# Patient Record
Sex: FEMALE | Race: OTHER | HISPANIC OR LATINO | Employment: FULL TIME | URBAN - NONMETROPOLITAN AREA
[De-identification: names, ages, dates, MRNs, and addresses within clinical notes are randomized per-mention and may not be internally consistent; named-entity substitution may affect disease eponyms.]

---

## 2018-05-29 ENCOUNTER — EVALUATION (OUTPATIENT)
Dept: PHYSICAL THERAPY | Facility: CLINIC | Age: 48
End: 2018-05-29
Payer: COMMERCIAL

## 2018-05-29 DIAGNOSIS — S62.354D CLOSED NONDISPLACED FRACTURE OF SHAFT OF FOURTH METACARPAL BONE OF RIGHT HAND WITH ROUTINE HEALING, SUBSEQUENT ENCOUNTER: Primary | ICD-10-CM

## 2018-05-29 PROCEDURE — 97014 ELECTRIC STIMULATION THERAPY: CPT | Performed by: PHYSICAL THERAPIST

## 2018-05-29 PROCEDURE — G8988 SELF CARE GOAL STATUS: HCPCS | Performed by: PHYSICAL THERAPIST

## 2018-05-29 PROCEDURE — 97140 MANUAL THERAPY 1/> REGIONS: CPT | Performed by: PHYSICAL THERAPIST

## 2018-05-29 PROCEDURE — G8987 SELF CARE CURRENT STATUS: HCPCS | Performed by: PHYSICAL THERAPIST

## 2018-05-29 PROCEDURE — 97110 THERAPEUTIC EXERCISES: CPT | Performed by: PHYSICAL THERAPIST

## 2018-05-29 PROCEDURE — 97162 PT EVAL MOD COMPLEX 30 MIN: CPT | Performed by: PHYSICAL THERAPIST

## 2018-05-29 NOTE — LETTER
May 30, 2018    MD Odilon Younger [address] [place] [id number]    Patient: Negrita Esteves   YOB: 1970   Date of Visit: 2018     Encounter Diagnosis     ICD-10-CM    1  Closed nondisplaced fracture of shaft of fourth metacarpal bone of right hand with routine healing, subsequent encounter S62 354D        Dear Dr Merrill Coil:    Please review the attached Plan of Care from [facility]'s recent visit  Please verify that you agree therapy should continue by signing the attached document and sending it back to our office  If you have any questions or concerns, please don't hesitate to call  Sincerely,  Deanna Curtis, NAT, CHT    Referring Provider:      I certify that I have read the below Plan of Care and certify the need for these services furnished under this plan of treatment while under my care  MD Odilon Younger [address] [place] [address]  VIA Facsimile: [phone]          PT Evaluation     Today's date: 2018  Patient name: Negrita Esteves  : 1970  MRN: [id number]  Referring provider: Pat Brizuela MD  Dx:   Encounter Diagnosis     ICD-10-CM    1  Closed nondisplaced fracture of shaft of fourth metacarpal bone of right hand with routine healing, subsequent encounter S62 354D                   Assessment    Assessment details: Pt is a 42 YO female presenting to PT with pain, decreased AROM, strength and tolerance to activity  Pt would benefit from skilled intervention to adddress these issues and maximize overall function  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  Dominant- Right; Involved-Right      Goals  ST  Decrease pain to 3-4 in 4 weeks            2  Decrease swelling            3  Increase AROM to composite fist            4   Provide orthotic for protection  LT  Increase functional motion and strength for independence with ADL and self care by DC            2   Ability to RTW and recreational activity by DC    Plan  Frequency: 2x week  Duration in weeks: 4  Treatment plan discussed with: patient        Subjective Evaluation    History of Present Illness  Date of surgery: 2018  Mechanism of injury: Pt trapped her hand in a door at home 18 fracturing her 4th MC  Pain  Current pain ratin  At best pain ratin  At worst pain ratin  Location: right hand    Hand dominance: right    Treatments  Current treatment: physical therapy  Patient Goals  Patient goals for therapy: decreased edema, decreased pain, increased motion, increased strength and return to work          Objective     General Comments     Wrist/Hand Comments  AROM right wrist E/F- 50/50; LF MP- 0/50 PIP- 0/85;  DIP- 0/40;                                                 RF MP- 10/30; PIP- 15/80; DIP- 0/40;                                                  SF MP- 0/35; PIP- 0/80; DIP- 0/40  Strength- deferred  Sensation- occasional tingling RF and SF; intact to LT  Inspection- removed dressings; fitted with custom HFO including MPs  Circumference at wrist- 17 0 cm; MPs- 20 0 cm      Flowsheet Rows      Most Recent Value   PT/OT G-Codes   Current Score  psfs-83   Projected Score  psfs-20   FOTO information reviewed  N/A   Assessment Type  Evaluation   G code set  Self-Care   Self Care Current Status ()  CM   Self Care Goal Status ()  CJ          Precautions: wear orthosis when active; no aggressive activity    Daily Treatment Diary     Manual              STM 15                                                                    Exercise Diary              TGE 3x            Wrist AROM 10x                                                                                                                                                                                                                                                          Modalities              HP/IFC 15 CP

## 2018-05-29 NOTE — PROGRESS NOTES
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- Right; Involved-Right      Goals  ST  Decrease pain to 3-4 in 4 weeks            2  Decrease swelling            3  Increase AROM to composite fist            4   Provide orthotic for protection  LT  Increase functional motion and strength for independence with ADL and self care by DC            2  Ability to RTW and recreational activity by DC    Plan  Frequency: 2x week  Duration in weeks: 4  Treatment plan discussed with: patient        Subjective Evaluation    History of Present Illness  Date of surgery: 2018  Mechanism of injury: Pt trapped her hand in a door at home 18 fracturing her 4th MC  Pain  Current pain ratin  At best pain ratin  At worst pain ratin  Location: right hand    Hand dominance: right    Treatments  Current treatment: physical therapy  Patient Goals  Patient goals for therapy: decreased edema, decreased pain, increased motion, increased strength and return to work          Objective     General Comments     Wrist/Hand Comments  AROM right wrist E/F- 50/50; LF MP- 0/50 PIP- 0/85;  DIP- 0/40;                                                 RF MP- 10/30; PIP- 15/80; DIP- 0/40;                                                  SF MP- 0/35; PIP- 0/80; DIP- 0/40  Strength- deferred  Sensation- occasional tingling RF and SF; intact to LT  Inspection- removed dressings; fitted with custom HFO including MPs  Circumference at wrist- 17 0 cm; MPs- 20 0 cm      Flowsheet Rows      Most Recent Value   PT/OT G-Codes   Current Score  psfs-83   Projected Score  psfs-20   FOTO information reviewed  N/A   Assessment Type  Evaluation   G code set  Self-Care   Self Care Current Status ()  CM   Self Care Goal Status ()  CJ          Precautions: wear orthosis when active; no aggressive activity    Daily Treatment Diary     Manual  5/29            STM 15                                                                    Exercise Diary  5/29            TGE 3x            Wrist AROM 10x                                                                                                                                                                                                                                                          Modalities  5/20            HP/IFC 15                         CP

## 2018-05-30 ENCOUNTER — TRANSCRIBE ORDERS (OUTPATIENT)
Dept: PHYSICAL THERAPY | Facility: CLINIC | Age: 48
End: 2018-05-30

## 2018-05-30 DIAGNOSIS — S62.354D: Primary | ICD-10-CM

## 2018-06-01 ENCOUNTER — OFFICE VISIT (OUTPATIENT)
Dept: PHYSICAL THERAPY | Facility: CLINIC | Age: 48
End: 2018-06-01
Payer: COMMERCIAL

## 2018-06-01 DIAGNOSIS — S62.354D CLOSED NONDISPLACED FRACTURE OF SHAFT OF FOURTH METACARPAL BONE OF RIGHT HAND WITH ROUTINE HEALING, SUBSEQUENT ENCOUNTER: Primary | ICD-10-CM

## 2018-06-01 PROCEDURE — 97110 THERAPEUTIC EXERCISES: CPT | Performed by: PHYSICAL THERAPIST

## 2018-06-01 PROCEDURE — 97140 MANUAL THERAPY 1/> REGIONS: CPT | Performed by: PHYSICAL THERAPIST

## 2018-06-01 PROCEDURE — 97760 ORTHOTIC MGMT&TRAING 1ST ENC: CPT | Performed by: PHYSICAL THERAPIST

## 2018-06-01 PROCEDURE — 97035 APP MDLTY 1+ULTRASOUND EA 15: CPT | Performed by: PHYSICAL THERAPIST

## 2018-06-01 PROCEDURE — 97014 ELECTRIC STIMULATION THERAPY: CPT | Performed by: PHYSICAL THERAPIST

## 2018-06-01 NOTE — PROGRESS NOTES
Daily Note     Today's date: 2018  Patient name: Manuela Ahumada  : 1970  MRN: [id number]  Referring provider: Louann Rodriguez MD  Dx:   Encounter Diagnosis     ICD-10-CM    1  Closed nondisplaced fracture of shaft of fourth metacarpal bone of right hand with routine healing, subsequent encounter S62 268P                   Subjective: less soreness, splint protecting      Objective: See treatment diary below    AROM right wrist E/F- 50/50; LF MP- 0/50 PIP- 0/85; DIP- 0/40;                                                 RF MP- 10/30; PIP- 15/80; DIP- 0/40;                                                  SF MP- 0/35; PIP- 0/80; DIP- 0/40  Strength- deferred  Sensation- occasional tingling RF and SF; intact to LT  Inspection- removed dressings; fitted with custom HFO including MPs  Circumference at wrist- 17 0 cm; MPs- 20 0 cm   Pt provided with additional liners; initiated US    Daily Treatment Diary      Manual                     STM 15  15                                           liners                                                                             Exercise Diary                     TGE 3x  3x                   Wrist AROM 10x  10x                                                                                                                                                                                                                                                                                                                                                                                                                                                                         Modalities                     HP/IFC 15  15                    US 3MZ   12                   CP    15                                Assessment: Tolerated treatment well  Patient would benefit from continued PT      Plan: Continue per plan of care

## 2018-06-06 ENCOUNTER — OFFICE VISIT (OUTPATIENT)
Dept: PHYSICAL THERAPY | Facility: CLINIC | Age: 48
End: 2018-06-06
Payer: COMMERCIAL

## 2018-06-06 DIAGNOSIS — S62.354D CLOSED NONDISPLACED FRACTURE OF SHAFT OF FOURTH METACARPAL BONE OF RIGHT HAND WITH ROUTINE HEALING, SUBSEQUENT ENCOUNTER: Primary | ICD-10-CM

## 2018-06-06 PROCEDURE — 97110 THERAPEUTIC EXERCISES: CPT

## 2018-06-06 PROCEDURE — 97014 ELECTRIC STIMULATION THERAPY: CPT

## 2018-06-06 PROCEDURE — 97140 MANUAL THERAPY 1/> REGIONS: CPT

## 2018-06-06 PROCEDURE — 97035 APP MDLTY 1+ULTRASOUND EA 15: CPT

## 2018-06-06 PROCEDURE — 97760 ORTHOTIC MGMT&TRAING 1ST ENC: CPT

## 2018-06-06 NOTE — PROGRESS NOTES
Daily Note     Today's date: 2018  Patient name: Juan Carlos Kinsey  : 1970  MRN: [id number]  Referring provider: Nati Lynne MD  Dx:   Encounter Diagnosis     ICD-10-CM    1  Closed nondisplaced fracture of shaft of fourth metacarpal bone of right hand with routine healing, subsequent encounter S64 799V                   Subjective: Pt CC is inability to bend her fingers like "she would like"  Soreness and stiffness main concern for patient   Objective:AROM right wrist E/F- 50/50; LF MP- 0/50 PIP- 0/85; DIP- 0/40;                                                 RF MP- 10/30; PIP- 15/80; DIP- 0/40;                                                  SF MP- 0/35; PIP- 0/80; DIP- 0/40  Strength- deferred  Sensation- occasional tingling RF and SF; intact to LT  Inspection- removed dressings; fitted with custom HFO including MPs  Circumference at wrist- 17 0 cm; MPs- 20 0 cm   Pt provided with additional liners; initiated US     Daily Treatment Diary      Manual                   STM 15  15  15                                         liners       X                                                                       Exercise Diary                   TGE 3x  3x  3x                 Wrist AROM 10x  10x  10x                                                                                                                                                                                                                                                                                                                                                                                                                                                                       Modalities                   HP/IFC 15  15  15                  US 3MZ   12  12                 CP    15  15                                Assessment: Tolerated treatment well   Patient would benefit from continued PT  Pt educated about HEP   Additional liner given today         Plan: Continue per plan of care

## 2018-06-13 ENCOUNTER — APPOINTMENT (OUTPATIENT)
Dept: PHYSICAL THERAPY | Facility: CLINIC | Age: 48
End: 2018-06-13
Payer: COMMERCIAL

## 2018-06-15 ENCOUNTER — OFFICE VISIT (OUTPATIENT)
Dept: PHYSICAL THERAPY | Facility: CLINIC | Age: 48
End: 2018-06-15
Payer: COMMERCIAL

## 2018-06-15 DIAGNOSIS — S62.354D CLOSED NONDISPLACED FRACTURE OF SHAFT OF FOURTH METACARPAL BONE OF RIGHT HAND WITH ROUTINE HEALING, SUBSEQUENT ENCOUNTER: Primary | ICD-10-CM

## 2018-06-15 PROCEDURE — 97014 ELECTRIC STIMULATION THERAPY: CPT

## 2018-06-15 PROCEDURE — 97140 MANUAL THERAPY 1/> REGIONS: CPT

## 2018-06-15 PROCEDURE — 97035 APP MDLTY 1+ULTRASOUND EA 15: CPT

## 2018-06-15 PROCEDURE — 97110 THERAPEUTIC EXERCISES: CPT

## 2018-06-15 NOTE — PROGRESS NOTES
Daily Note     Today's date: 6/15/2018  Patient name: Suly Shaikh  : 1970  MRN: [id number]  Referring provider: Daniel Betts MD  Dx:   Encounter Diagnosis     ICD-10-CM    1  Closed nondisplaced fracture of shaft of fourth metacarpal bone of right hand with routine healing, subsequent encounter A38 716H                   Subjective: Pt reported continued stiffness in R RF, however pt noted improved ROM while doing TGE  Objective: See treatment diary below    AROM right wrist E/F- 50/50; LF MP- 0/50 PIP- 0/85;  DIP- 0/40;                                                 RF MP- 0/60; PIP- 0/95; DIP- 0/65;                                                  SF MP- 0/60; PIP- 0/95; DIP- 0/70  Strength- deferred  Sensation- occasional tingling RF and SF; intact to LT  Inspection- removed dressings; fitted with custom HFO including MPs  Circumference at wrist- 17 0 cm; MPs- 20 0 cm   Pt provided with additional liners; initiated US    Manual  5/29  6/1  6/6  6/15               STM 15  15  15  12                                       liners       X                                                                       Exercise Diary  5/29  6/1  6/6  6/15               TGE 3x  3x  3x  3x               Wrist AROM 10x  10x  10x  10x                                        TP         T 2'                  Scrape         XS 2'                Twister                        Blue        II /10                                                                                                                                                                                                                                                                                                                                             Modalities  5/29  6/1  6/6  6/15               HP/IFC 15  15  15  15                US 3MZ   12  12  13               CP    15  15  15                                 Assessment: Pt tolerated exercise progression well w/ some difficulty noted d/t decreased strength  Pt was shown AAROM exercises for home to improve composite fist   Pt would benefit from continued PT  Plan: Continue per plan of care

## 2018-06-21 ENCOUNTER — OFFICE VISIT (OUTPATIENT)
Dept: PHYSICAL THERAPY | Facility: CLINIC | Age: 48
End: 2018-06-21
Payer: COMMERCIAL

## 2018-06-21 DIAGNOSIS — S62.354D CLOSED NONDISPLACED FRACTURE OF SHAFT OF FOURTH METACARPAL BONE OF RIGHT HAND WITH ROUTINE HEALING, SUBSEQUENT ENCOUNTER: Primary | ICD-10-CM

## 2018-06-21 PROCEDURE — 97140 MANUAL THERAPY 1/> REGIONS: CPT

## 2018-06-21 PROCEDURE — 97035 APP MDLTY 1+ULTRASOUND EA 15: CPT

## 2018-06-21 PROCEDURE — 97110 THERAPEUTIC EXERCISES: CPT

## 2018-06-21 PROCEDURE — 97014 ELECTRIC STIMULATION THERAPY: CPT

## 2018-06-21 NOTE — PROGRESS NOTES
Daily Note     Today's date: 2018  Patient name: Deepali Etienne  : 1970  MRN: [id number]  Referring provider: Kayla Perez MD  Dx:   Encounter Diagnosis     ICD-10-CM    1  Closed nondisplaced fracture of shaft of fourth metacarpal bone of right hand with routine healing, subsequent encounter S61 524L                   Subjective: Pt reported R hand feels very stiff in the morning, however pt noted it feels better after performing her tendon glides   Pt also reported being able to peel and cut potatoes which she was not able to do before  Objective: See treatment diary below    AROM right wrist E/F- 50/50; LF MP- 0/50 PIP- 0/85;  DIP- 0/40;                                                 RF MP- 0/60; PIP- 0/95; DIP- 0/65;                                                  SF MP- 0/60; PIP- 0/95; DIP- 0/70  Strength- deferred  Sensation- occasional tingling RF and SF; intact to LT  Inspection- removed dressings; fitted with custom HFO including MPs  Circumference at wrist- 17 0 cm; MPs- 20 0 cm   Pt provided with additional liners; initiated US     Manual  5/29  6/1  6/6  6/15  6/21             STM 15  15  15  12  12                                     liners       X                                                                       Exercise Diary  5/29  6/1  6/6  6/15  6/21             TGE 3x  3x  3x  3x  3x             Wrist AROM 10x  10x  10x  10x  10x                                      TP         T 2'  T 2'                Scrape         XS 2' XS 2'              Twister                      Blue        II 2/10 II 2/10                                                                                                                                                                                                                                                                                                                                           Modalities  5/29  6/1  6/6  6/15  6/21             HP/IFC 15  15  15  15  15              US 3MZ   12  12  12  12             CP    15  15  15  15                                     Assessment: Tolerated treatment well  Patient would benefit from continued PT      Plan: Continue per plan of care

## 2018-06-22 ENCOUNTER — APPOINTMENT (OUTPATIENT)
Dept: PHYSICAL THERAPY | Facility: CLINIC | Age: 48
End: 2018-06-22
Payer: COMMERCIAL

## 2018-06-27 ENCOUNTER — OFFICE VISIT (OUTPATIENT)
Dept: PHYSICAL THERAPY | Facility: CLINIC | Age: 48
End: 2018-06-27
Payer: COMMERCIAL

## 2018-06-27 DIAGNOSIS — S62.354D CLOSED NONDISPLACED FRACTURE OF SHAFT OF FOURTH METACARPAL BONE OF RIGHT HAND WITH ROUTINE HEALING, SUBSEQUENT ENCOUNTER: Primary | ICD-10-CM

## 2018-06-27 PROCEDURE — G8987 SELF CARE CURRENT STATUS: HCPCS | Performed by: PHYSICAL THERAPIST

## 2018-06-27 PROCEDURE — 97760 ORTHOTIC MGMT&TRAING 1ST ENC: CPT

## 2018-06-27 PROCEDURE — 97110 THERAPEUTIC EXERCISES: CPT

## 2018-06-27 PROCEDURE — 97140 MANUAL THERAPY 1/> REGIONS: CPT

## 2018-06-27 PROCEDURE — G8988 SELF CARE GOAL STATUS: HCPCS | Performed by: PHYSICAL THERAPIST

## 2018-06-27 PROCEDURE — 97035 APP MDLTY 1+ULTRASOUND EA 15: CPT

## 2018-06-27 PROCEDURE — 97014 ELECTRIC STIMULATION THERAPY: CPT

## 2018-06-27 NOTE — PROGRESS NOTES
PT Re-Evaluation     Today's date: 2018  Patient name: Suly Shaikh  : 1970  MRN: [id number]  Referring provider: Daniel Betts MD  Dx:   Encounter Diagnosis     ICD-10-CM    1  Closed nondisplaced fracture of shaft of fourth metacarpal bone of right hand with routine healing, subsequent encounter S62 354D        Start Time: 7934  Stop Time: 1455  Total time in clinic (min): 70 minutes    Assessment    Assessment details: Pt is a 40 YO female presenting to PT with pain, decreased AROM, strength and tolerance to activity  Pt would benefit from skilled intervention to adddress these issues and maximize overall function  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  Dominant- Right; Involved-Right  Pt is progressing steadily with AROM and light strengthening  She has cont working light duty    Goals  ST  Decrease pain to 3-4 in 4 weeks            2  Decrease swelling            3  Increase AROM to composite fist            4   Provide orthotic for protection  LT  Increase functional motion and strength for independence with ADL and self care by DC            2  Ability to RTW and recreational activity by DC    Plan  Frequency: 2x week  Duration in weeks: 4  Treatment plan discussed with: patient        Subjective Evaluation    History of Present Illness  Date of surgery: 2018  Mechanism of injury: Pt trapped her hand in a door at home 18 fracturing her 4th MC  Pain  Current pain ratin  At best pain ratin  At worst pain ratin  Location: right hand    Hand dominance: right    Treatments  Current treatment: physical therapy  Patient Goals  Patient goals for therapy: decreased edema, decreased pain, increased motion, increased strength and return to work          Objective     General Comments     Wrist/Hand Comments  AROM right wrist E/F- 50/50; LF MP- 0/50 PIP- 0/85;  DIP- 0/40;                                                 RF MP- 0/60; PIP- 0/95; DIP- 0/65;                                                  HQ MP- 0/60; PIP- 0/95; DIP- 0/70  Strength- deferred  Sensation- occasional tingling RF and SF; intact to LT  Inspection- removed dressings; fitted with custom HFO including MPs  Circumference at wrist- 17 0 cm; MPs- 20 0 cm   Pt provided with additional liners; initiated US for scar          Flowsheet Rows      Most Recent Value   PT/OT G-Codes   Current Score  psfs-42   Projected Score  psfs-20   FOTO information reviewed  N/A   Assessment Type  Re-evaluation   G code set  Self-Care   Self Care Current Status ()  CK   Self Care Goal Status ()  CJ          Precautions: wear orthosis when active; no aggressive activity    Daily Treatment Diary     Manual  5/29  6/1  6/6  6/15  6/21  6/27           STM 15  15  15  12  12  15                                   liners       X                                                                       Exercise Diary  5/29  6/1  6/6  6/15  6/21  6/27           TGE 3x  3x  3x  3x  3x  3x           Wrist AROM 10x  10x  10x  10x  10x  10x                                    TP         T 2'  T 2'  T 2'              Scrape MP        XS 2' XS 2' XS 2'             Twister        20/20 20/20 20/20             Blue        II 2/10 II 2/10  II  2/10                                                                                                                                                                                                                                                                                                                                         Modalities  5/29  6/1  6/6  6/15  6/21  6/27           HP/IFC 15  15  15  15  15  15            US 3MZ   12  12  12  12  12           CP    15  15  15  15  15

## 2018-06-27 NOTE — PROGRESS NOTES
Daily Note     Today's date: 2018  Patient name: Prasad Reid  : 1970  MRN: [id number]  Referring provider: Kenny Frausto MD  Dx:   Encounter Diagnosis     ICD-10-CM    1  Closed nondisplaced fracture of shaft of fourth metacarpal bone of right hand with routine healing, subsequent encounter S65 836D                   Subjective: Pt reported she has returned to work on light duty and is taking things slow  Pt also reported some stiffness across MP and PIP joints in R hand, however pt reports it has improved since initial evaluation  Objective: See treatment diary below    AROM right wrist E/F- 50/50; LF MP- 0/50 PIP- 0/85;  DIP- 0/40;                                                 RF MP- 0/60; PIP- 0/95; DIP- 0/65;                                                  SF MP- 0/60; PIP- 0/95; DIP- 0/70  Strength- deferred  Sensation- occasional tingling RF and SF; intact to LT  Inspection- removed dressings; fitted with custom HFO including MPs  Circumference at wrist- 17 0 cm; MPs- 20 0 cm   Pt provided with additional liners; initiated US     Manual  5/29  6/1  6/6  6/15  6/21  6/27           STM 15  15  15  12  12  15                                   liners       X                                                                       Exercise Diary  5/29  6/1  6/6  6/15  6/21  6/27           TGE 3x  3x  3x  3x  3x  3x           Wrist AROM 10x  10x  10x  10x  10x  10x                                    TP         T 2'  T 2'  T 2'              Scrape MP        XS 2' XS 2' XS 2'             Twister                     Blue        II 2/10 II 2/10  II  2/10                                                                                                                                                                                                                                                                                                                                       Modalities  5/29  6/1  6/6  6/15  6/21  6/27           HP/IFC 15  15  15  15  15  15            US 3MZ   12  12  12  12  12           CP    15  15  15  15  15                       Assessment: Tolerated treatment well  Patient would benefit from continued PT      Plan: Continue per plan of care

## 2018-06-27 NOTE — LETTER
2018    [facility]    Patient: Philipp Laughlin   YOB: 1970   Date of Visit: 2018     Encounter Diagnosis     ICD-10-CM    1  Closed nondisplaced fracture of shaft of fourth metacarpal bone of right hand with routine healing, subsequent encounter S62 354D        Dear Dr Niru Souza:    Please review the attached Plan of Care from [facility]'s recent visit  Please verify that you agree therapy should continue by signing the attached document and sending it back to our office  If you have any questions or concerns, please don't hesitate to call  Sincerely,  Niko Simms, DPT, CHT      Referring Provider:      I certify that I have read the below Plan of Care and certify the need for these services furnished under this plan of treatment while under my care  Shelbi Tubbs Facsimile: [phone]          Daily Note     Today's date: 2018  Patient name: Philipp Laughlin  : 1970  MRN: [id number]  Referring provider: Yanely Rush MD  Dx:   Encounter Diagnosis     ICD-10-CM    1  Closed nondisplaced fracture of shaft of fourth metacarpal bone of right hand with routine healing, subsequent encounter S62 354D                   Subjective: Pt reported she has returned to work on light duty and is taking things slow  Pt also reported some stiffness across MP and PIP joints in R hand, however pt reports it has improved since initial evaluation  Objective: See treatment diary below    AROM right wrist E/F- 50/50; LF MP- 0/50 PIP- 0/85;  DIP- 0/40;                                                 RF MP- 0/60; PIP- 0/95; DIP- 0/65;                                                  SF MP- 0/60; PIP- 0/95; DIP- 0/70  Strength- deferred  Sensation- occasional tingling RF and SF; intact to LT  Inspection- removed dressings; fitted with custom HFO including MPs  Circumference at wrist- 17 0 cm; MPs- 20 0 cm   Pt provided with additional liners; initiated US     Manual  5/29  6/1  6/6  6/15  6/21  6/27           STM 15  15  15  12  12  15                                   liners       X                                                                       Exercise Diary  5/29  6/1  6/6  6/15  6/21  6/27           TGE 3x  3x  3x  3x  3x  3x           Wrist AROM 10x  10x  10x  10x  10x  10x                                    TP         T 2'  T 2'  T 2'              Scrape MP        XS 2' XS 2' XS 2'             Twister                     Blue        II 2/10 II 2/10  II  2/10                                                                                                                                                                                                                                                                                                                                         Modalities  5/29  6/1  6/6  6/15  6/21  6/27           HP/IFC 15  15  15  15  15  15            US 3MZ   12  12  12  12  12           CP    15  15  15  15  15                       Assessment: Tolerated treatment well  Patient would benefit from continued PT      Plan: Continue per plan of care  PT Re-Evaluation     Today's date: 2018  Patient name: Eliazar Barragan  : 1970  MRN: [id number]  Referring provider: Arlan Brunner, MD  Dx:   Encounter Diagnosis     ICD-10-CM    1  Closed nondisplaced fracture of shaft of fourth metacarpal bone of right hand with routine healing, subsequent encounter S62 354D        Start Time: 4344  Stop Time: 1455  Total time in clinic (min): 70 minutes    Assessment    Assessment details: Pt is a 42 YO female presenting to PT with pain, decreased AROM, strength and tolerance to activity  Pt would benefit from skilled intervention to adddress these issues and maximize overall function  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  Dominant- Right;  Involved-Right  Pt is progressing steadily with AROM and light strengthening  She has cont working light duty    Goals  ST  Decrease pain to 3-4 in 4 weeks            2  Decrease swelling            3  Increase AROM to composite fist            4   Provide orthotic for protection  LT  Increase functional motion and strength for independence with ADL and self care by DC            2  Ability to RTW and recreational activity by DC    Plan  Frequency: 2x week  Duration in weeks: 4  Treatment plan discussed with: patient        Subjective Evaluation    History of Present Illness  Date of surgery: 2018  Mechanism of injury: Pt trapped her hand in a door at home 18 fracturing her 4th   Pain  Current pain ratin  At best pain ratin  At worst pain ratin  Location: right hand    Hand dominance: right    Treatments  Current treatment: physical therapy  Patient Goals  Patient goals for therapy: decreased edema, decreased pain, increased motion, increased strength and return to work          Objective     General Comments     Wrist/Hand Comments  AROM right wrist E/F- 50/50; LF MP- 0/50 PIP- 0/85;  DIP- 0/40;                                                 RF MP- 0/60; PIP- 0/95; DIP- 0/65;                                                  SF MP- 0/60; PIP- 0/95; DIP- 0/70  Strength- deferred  Sensation- occasional tingling RF and SF; intact to LT  Inspection- removed dressings; fitted with custom HFO including MPs  Circumference at wrist- 17 0 cm; MPs- 20 0 cm   Pt provided with additional liners; initiated US for scar          Flowsheet Rows      Most Recent Value   PT/OT G-Codes   Current Score  psfs-42   Projected Score  psfs-20   FOTO information reviewed  N/A   Assessment Type  Re-evaluation   G code set  Self-Care   Self Care Current Status ()  CK   Self Care Goal Status ()  CJ          Precautions: wear orthosis when active; no aggressive activity    Daily Treatment Diary     Manual  5/29  6/1  6/6  6/15  6/21  6/27           STM 15  15  15  12  12  15                                   liners       X                                                                       Exercise Diary  5/29 6/1  6/6  6/15  6/21  6/27           TGE 3x  3x  3x  3x  3x  3x           Wrist AROM 10x  10x  10x  10x  10x  10x                                    TP         T 2'  T 2'  T 2'              Scrape MP        XS 2' XS 2' XS 2'             Twister        20/20 20/20 20/20             Blue        II 2/10 II 2/10  II  2/10                                                                                                                                                                                                                                                                                                                                         Modalities  5/29  6/1  6/6  6/15  6/21  6/27           HP/IFC 15  15  15  15  15  15            US 3MZ   12  12  12  12  12           CP    15  15  15  15  15

## 2018-06-29 ENCOUNTER — APPOINTMENT (OUTPATIENT)
Dept: PHYSICAL THERAPY | Facility: CLINIC | Age: 48
End: 2018-06-29
Payer: COMMERCIAL

## 2018-07-03 ENCOUNTER — OFFICE VISIT (OUTPATIENT)
Dept: PHYSICAL THERAPY | Facility: CLINIC | Age: 48
End: 2018-07-03
Payer: COMMERCIAL

## 2018-07-03 DIAGNOSIS — S62.354D CLOSED NONDISPLACED FRACTURE OF SHAFT OF FOURTH METACARPAL BONE OF RIGHT HAND WITH ROUTINE HEALING, SUBSEQUENT ENCOUNTER: Primary | ICD-10-CM

## 2018-07-03 PROCEDURE — 97140 MANUAL THERAPY 1/> REGIONS: CPT

## 2018-07-03 PROCEDURE — 97110 THERAPEUTIC EXERCISES: CPT

## 2018-07-03 PROCEDURE — 97035 APP MDLTY 1+ULTRASOUND EA 15: CPT

## 2018-07-03 PROCEDURE — 97760 ORTHOTIC MGMT&TRAING 1ST ENC: CPT

## 2018-07-03 PROCEDURE — 97014 ELECTRIC STIMULATION THERAPY: CPT

## 2018-07-03 NOTE — PROGRESS NOTES
Daily Note     Today's date: 7/3/2018  Patient name: Alana Taylor  : 1970  MRN: [id number]  Referring provider: Janette Callahan MD  Dx:   Encounter Diagnosis     ICD-10-CM    1  Closed nondisplaced fracture of shaft of fourth metacarpal bone of right hand with routine healing, subsequent encounter S63 [id number]                   Subjective: Pt reports improvement in her hand  " I still have soreness in all my finger knuckles"      Objective:AROM right wrist E/F- 50/50; LF MP- 0/50 PIP- 0/85; DIP- 0/40;                                                 RF MP- 0/75; PIP- 0/105;  DIP- 0/65                                                  SF MP- 0/75 PIP- 0/95; DIP- 0/70  Strength-  40/60 Sensation- occasional tingling RF and SF; intact to LT  Inspection- removed dressings; fitted with custom HFO including MPs  Circumference at wrist- 17 0 cm; MPs- 20 0 cm        Manual  5/29  6/1  6/6  6/15  7/3             STM 15  15  15  12  12                                     liners       X                                          Isotoner          R small                   Exercise Diary    6/6  6/15  7/3             TGE 3x  3x  3x  3x  3x             Wrist AROM 10x  10x  10x  10x  10x                                      TP         T 2'  T 2'                Scrape         XS 2'  T 2/10              Twister          20/20              Blue          III 2/10              Jim          20 2/10              DB E/F          3 2/10              Flexbar          Y 2/10                                                                                                                                                                                                                                                                   Modalities    6/6  6/15  7/3             HP/IFC 15  15  15  15  15              US 3MZ   12  12  12  13             CP    15  15  15  15                           Assessment: Pt tolerated exercise progression well today  New exercises only created fatigue  Pt issued Isotoner glove for swelling     Pt would benefit from continued PT         Plan: Continue per plan of care  Pt to see M/D today

## 2018-07-12 ENCOUNTER — OFFICE VISIT (OUTPATIENT)
Dept: PHYSICAL THERAPY | Facility: CLINIC | Age: 48
End: 2018-07-12
Payer: COMMERCIAL

## 2018-07-12 DIAGNOSIS — S62.354D CLOSED NONDISPLACED FRACTURE OF SHAFT OF FOURTH METACARPAL BONE OF RIGHT HAND WITH ROUTINE HEALING, SUBSEQUENT ENCOUNTER: Primary | ICD-10-CM

## 2018-07-12 PROCEDURE — 97140 MANUAL THERAPY 1/> REGIONS: CPT

## 2018-07-12 PROCEDURE — 97014 ELECTRIC STIMULATION THERAPY: CPT

## 2018-07-12 PROCEDURE — 97035 APP MDLTY 1+ULTRASOUND EA 15: CPT

## 2018-07-12 PROCEDURE — 97110 THERAPEUTIC EXERCISES: CPT

## 2018-07-12 NOTE — PROGRESS NOTES
Daily Note     Today's date: 2018  Patient name: Sruthi Sánchez  : 1970  MRN: [id number]  Referring provider: Sylvain Brasher MD  Dx:   Encounter Diagnosis     ICD-10-CM    1  Closed nondisplaced fracture of shaft of fourth metacarpal bone of right hand with routine healing, subsequent encounter S62 [id number]                   Subjective: Pt reported slight improvement in R hand pain and applies ice at home as needed to help decrease pain and edema sx  Objective:  AROM right wrist E/F- 50/50; LF MP- 0/50 PIP- 0/85; DIP- 0/40;                                                 RF MP- 0/75; PIP- 0/105;  DIP- 0/65                                                  SF MP- 0/75 PIP- 0/95; DIP- 0/70  Strength-  40/60 Sensation- occasional tingling RF and SF; intact to LT  Inspection- removed dressings; fitted with custom HFO including MPs  Circumference at wrist- 17 0 cm; MPs- 20 0 cm        Manual  5/29  6/1  6/6  6/15  7/3  7/12           STM 15  15  15  12  12  12                                   liners       X                                          Isotoner          R small                   Exercise Diary  5/29  6/1  6/6  6/15  7/3  7/12           TGE 3x  3x  3x  3x  3x  5x           Wrist AROM 10x  10x  10x  10x  10x  10x                                    TP         T 2'  T 2'  Y 2'              Scrape         XS 2'  T 2/10  T  2/10            Twister        20/ 20/20 20/20             Blue        II 2/10  III 2/10  III  2/10            Jim          20 2/10  20  210            DB E/F          3 2/10  3  2/10            Flexbar          Y 2/10  Y  2/10                                                                                                                                                                                                                                                                 Modalities  5/29  6/1  6/6  6/15  7/3  7/12           HP/IFC 15  15  15  15  15  15          US 3MZ   12  12  12  12  12           CP    15  15  15  15  15                  Assessment: Pt tolerated exercise progression well w/ some soreness in R hand post exercise  Pt would further benefit from continued PT to further improve strength and ROM  Plan: Continue per plan of care

## 2018-07-17 ENCOUNTER — OFFICE VISIT (OUTPATIENT)
Dept: PHYSICAL THERAPY | Facility: CLINIC | Age: 48
End: 2018-07-17
Payer: COMMERCIAL

## 2018-07-17 DIAGNOSIS — S62.354D CLOSED NONDISPLACED FRACTURE OF SHAFT OF FOURTH METACARPAL BONE OF RIGHT HAND WITH ROUTINE HEALING, SUBSEQUENT ENCOUNTER: Primary | ICD-10-CM

## 2018-07-17 PROCEDURE — 97014 ELECTRIC STIMULATION THERAPY: CPT

## 2018-07-17 PROCEDURE — 97140 MANUAL THERAPY 1/> REGIONS: CPT

## 2018-07-17 PROCEDURE — 97035 APP MDLTY 1+ULTRASOUND EA 15: CPT

## 2018-07-17 PROCEDURE — 97110 THERAPEUTIC EXERCISES: CPT

## 2018-07-17 NOTE — PROGRESS NOTES
Daily Note     Today's date: 2018  Patient name: Milad Roca  : 1970  MRN: [id number]  Referring provider: Marizol Caruso MD  Dx:   Encounter Diagnosis     ICD-10-CM    1  Closed nondisplaced fracture of shaft of fourth metacarpal bone of right hand with routine healing, subsequent encounter [id number]                 Subjective: Pt reports feeling achiness today in her hand  "It has been sore lately since returning to work "      Objective: AROM right wrist E/F- 50/50; LF MP- 0/50 PIP- 0/85; DIP- 0/40;                                                 RF MP- 0/75; PIP- 0/105;  DIP- 0/65                                                  SF MP- 0/75 PIP- 0/95; DIP- 0/70  Strength-  40/60 Sensation- occasional tingling RF and SF; intact to LT  Inspection- removed dressings; fitted with custom HFO including MPs  Circumference at wrist- 17 0 cm; MPs- 20 0 cm        Manual  5/29  6/1  6/6  6/15  7/3  7/12  7/17         STM 15  15  15  12  12  12  12                                 liners       X                                          Isotoner          R small                   Exercise Diary  5/29  6/1  6/6  6/15  7/3  7/12  7/17         TGE 3x  3x  3x  3x  3x  5x  5x         Wrist AROM 10x  10x  10x  10x  10x  10x  10x                                  TP         T 2'  T 2'  Y 2'  Y 2'            Scrape         XS 2'  T 2/10  T  2/10  T 2/10          Twister        20/20 20/20 20/20   20/20          Blue        II 2/10  III 2/10  III  2/10  III 2/10          Jim          20 2/10  20  210  20 2/10          DB E/F          3 2/10  3  2/10  3 2/10          Flexbar          Y 2/10  Y  2/10  R 2/10                                                                                                                                                                                                                                                               Modalities  5/29  6/1  6/6  6/15  7/3  7/12  7/17       HP/IFC 15  15  15  15  15  15  15          US 3MZ   12  12  12  12  12  12         CP    15  15  15  15  15  15                  Assessment: Pt tolerated exercise progression well  CC is difficulty of exercises but no discomfort present during exercises  Pt would further benefit from continued PT to further improve strength and ROM          Plan: Continue per plan of care

## 2018-07-26 ENCOUNTER — TRANSCRIBE ORDERS (OUTPATIENT)
Dept: PHYSICAL THERAPY | Facility: CLINIC | Age: 48
End: 2018-07-26

## 2018-07-26 ENCOUNTER — OFFICE VISIT (OUTPATIENT)
Dept: PHYSICAL THERAPY | Facility: CLINIC | Age: 48
End: 2018-07-26
Payer: COMMERCIAL

## 2018-07-26 DIAGNOSIS — S62.354D CLOSED NONDISPLACED FRACTURE OF SHAFT OF FOURTH METACARPAL BONE OF RIGHT HAND WITH ROUTINE HEALING, SUBSEQUENT ENCOUNTER: Primary | ICD-10-CM

## 2018-07-26 PROCEDURE — 97110 THERAPEUTIC EXERCISES: CPT | Performed by: PHYSICAL THERAPIST

## 2018-07-26 PROCEDURE — 97140 MANUAL THERAPY 1/> REGIONS: CPT | Performed by: PHYSICAL THERAPIST

## 2018-07-26 PROCEDURE — 97014 ELECTRIC STIMULATION THERAPY: CPT | Performed by: PHYSICAL THERAPIST

## 2018-07-26 PROCEDURE — 97035 APP MDLTY 1+ULTRASOUND EA 15: CPT | Performed by: PHYSICAL THERAPIST

## 2018-07-26 PROCEDURE — G8987 SELF CARE CURRENT STATUS: HCPCS | Performed by: PHYSICAL THERAPIST

## 2018-07-26 PROCEDURE — G8988 SELF CARE GOAL STATUS: HCPCS | Performed by: PHYSICAL THERAPIST

## 2018-07-26 NOTE — PROGRESS NOTES
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  Dominant- Right; Involved-Right  Pt is progressing steadily with AROM and light strengthening  She has cont working light duty and notes her strength deficit interferes with her ability tp provide full patient care    Goals  ST  Decrease pain to 3-4 in 4 weeks            2  Decrease swelling            3  Increase AROM to composite fist            4   Provide orthotic for protection  LT  Increase functional motion and strength for independence with ADL and self care by DC            2  Ability to RTW and recreational activity by DC    Plan  Frequency: 2x week  Duration in weeks: 4  Treatment plan discussed with: patient        Subjective Evaluation    History of Present Illness  Date of surgery: 2018  Mechanism of injury: Pt trapped her hand in a door at home 18 fracturing her 4th MC  Pain  Current pain ratin  At best pain ratin  At worst pain rating: 3  Location: right hand    Hand dominance: right    Treatments  Current treatment: physical therapy  Patient Goals  Patient goals for therapy: decreased edema, decreased pain, increased motion, increased strength and return to work          Objective     General Comments     Wrist/Hand Comments  AROM right wrist E/F- 50/50; LF MP- 0/80 PIP- 0/89;  DIP- 0/65;                                                 RF MP- 0/75; PIP- 0/105;  DIP- 0/65                                                  SF MP- 0/75 PIP- 0/95; DIP- 0/75  Strength-  II- 46/62  Sensation- occasional tingling RF and SF; intact to LT  Inspection- well healing incision; mild local swelling  Circumference at wrist- 17 0 cm; MPs- 20 0 cm         Flowsheet Rows      Most Recent Value   PT/OT G-Codes   Current Score  psfs-35   Projected Score  psfs-20   FOTO information reviewed  N/A   Assessment Type  Re-evaluation   G code set  Self-Care   Self Care Current Status ()  CJ   Self Care Goal Status ()            Precautions: wear orthosis when active; no aggressive activity    Daily Treatment Diary     Manual  5/29  6/1  6/6  6/15  7/3  7/12  7/17 7/26       STM 15  15  15  12  12 12  12  12                               liners       X                                          Isotoner          R small                   Exercise Diary  5/29  6/1  6/6  6/15  7/3  7/12  7/17  7/26       TGE 3x  3x  3x  3x  3x  5x  5x  5x       Wrist AROM 10x  10x  10x  10x  10x  10x  10x 10x                                TP         T 2'  T 2'  Y 2'  Y 2'  Y2'          Scrape         XS 2'  T 2/10  T  2/10  T 2/10  T 2/10        Twister        20/20 20/20 20/20   20/20  20/20        Blue        II 2/10  III 2/10  III  2/10  III 2/10  III 2/10        Jim          20 2/10  20  210  20 2/10  20 2/10        DB E/F          3 2/10  3  2/10  3 2/10  3 2/10        Flexbar          Y 2/10  Y  2/10  R 2/10  R 2/10                                                                                                                                                                                                                                                             Modalities  5/29  6/1  6/6  6/15  7/3  7/12  7/17  7/26       HP/IFC 15  15  15  15  15  15  15  15        US 3MZ   12  12  12  12  12  12  12       CP    15  15  15  15  15 15 15

## 2018-07-26 NOTE — LETTER
2018    Gosia Tillman MD  [facility] [address] [place] [address]    Patient: Ericka Lynn   YOB: 1970   Date of Visit: 2018     Encounter Diagnosis     ICD-10-CM    1  Closed nondisplaced fracture of shaft of fourth metacarpal bone of right hand with routine healing, subsequent encounter S62 354D        Dear Dr David Wakefield:    Please review the attached Plan of Care from [facility]'s recent visit  Please verify that you agree therapy should continue by signing the attached document and sending it back to our office  If you have any questions or concerns, please don't hesitate to call  Sincerely,    Meenakshi Florentino, DPT, CHT    Referring Provider:      I certify that I have read the below Plan of Care and certify the need for these services furnished under this plan of treatment while under my care  Gosia Tillman MD  [facility]: [phone]          PT Re-Evaluation     Today's date: 2018  Patient name: Ericka Lynn  : 1970  MRN: [id number]  Referring provider: Aziza Torres MD  Dx:   Encounter Diagnosis     ICD-10-CM    1  Closed nondisplaced fracture of shaft of fourth metacarpal bone of right hand with routine healing, subsequent encounter S62 354D                   Assessment    Assessment details: Pt is a 42 YO female presenting to PT with pain, decreased AROM, strength and tolerance to activity  Pt would benefit from skilled intervention to adddress these issues and maximize overall function  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  Dominant- Right; Involved-Right  Pt is progressing steadily with AROM and light strengthening  She has cont working light duty and notes her strength deficit interferes with her ability tp provide full patient care    Goals  ST  Decrease pain to 3-4 in 4 weeks            2  Decrease swelling            3    Increase AROM to composite fist            4   Provide orthotic for protection  LT  Increase functional motion and strength for independence with ADL and self care by DC            2  Ability to RTW and recreational activity by DC    Plan  Frequency: 2x week  Duration in weeks: 4  Treatment plan discussed with: patient        Subjective Evaluation    History of Present Illness  Date of surgery: 2018  Mechanism of injury: Pt trapped her hand in a door at home 18 fracturing her 4th MC  Pain  Current pain ratin  At best pain ratin  At worst pain rating: 3  Location: right hand    Hand dominance: right    Treatments  Current treatment: physical therapy  Patient Goals  Patient goals for therapy: decreased edema, decreased pain, increased motion, increased strength and return to work          Objective     General Comments     Wrist/Hand Comments  AROM right wrist E/F- 50/50; LF MP- 0/80 PIP- 0/89; DIP- 0/65;                                                 RF MP- 0/75; PIP- 0/105;  DIP- 0/65                                                  SF MP- 0/75 PIP- 0/95; DIP- 0/75  Strength-  II- 46/62  Sensation- occasional tingling RF and SF; intact to LT  Inspection- well healing incision; mild local swelling  Circumference at wrist- 17 0 cm; MPs- 20 0 cm         Flowsheet Rows      Most Recent Value   PT/OT G-Codes   Current Score  psfs-35   Projected Score  psfs-20   FOTO information reviewed  N/A   Assessment Type  Re-evaluation   G code set  Self-Care   Self Care Current Status ()  CJ   Self Care Goal Status ()  CJ          Precautions: wear orthosis when active; no aggressive activity    Daily Treatment Diary     Manual  5/29  6/1  6/6  6/15  7/3  7/12  7/17 7/26       STM 15  15  15  12  12  12  12  12                               liners       X                                          Isotoner          R small                   Exercise Diary  5/29  6/1  6/6  6/15  7/3  7/12  7/17  7/26       TGE 3x  3x  3x  3x  3x  5x  5x  5x       Wrist AROM 10x  10x  10x  10x  10x  10x  10x 10x                                TP         T 2'  T 2'  Y 2'  Y 2'  Y2'          Scrape         XS 2'  T 2/10  T  2/10  T 2/10  T 2/10        Twister        20/20 20/20 20/20   20/20  20/20        Blue        II 2/10  III 2/10  III  2/10  III 2/10  III 2/10        Jim          20 2/10  20  210  20 2/10  20 2/10        DB E/F          3 2/10  3  2/10  3 2/10  3 2/10        Flexbar          Y 2/10  Y  2/10  R 2/10  R 2/10                                                                                                                                                                                                                                                             Modalities  5/29  6/1  6/6  6/15  7/3  7/12  7/17 7/26       HP/IFC 15  15  15  15  15  15  15  15        US 3MZ   12  12  12  12  12  12  12       CP    15  15  15  15  15 15 15

## 2018-08-16 ENCOUNTER — OFFICE VISIT (OUTPATIENT)
Dept: PHYSICAL THERAPY | Facility: CLINIC | Age: 48
End: 2018-08-16
Payer: COMMERCIAL

## 2018-08-16 DIAGNOSIS — S62.354D CLOSED NONDISPLACED FRACTURE OF SHAFT OF FOURTH METACARPAL BONE OF RIGHT HAND WITH ROUTINE HEALING, SUBSEQUENT ENCOUNTER: Primary | ICD-10-CM

## 2018-08-16 PROCEDURE — 97010 HOT OR COLD PACKS THERAPY: CPT | Performed by: PHYSICAL THERAPIST

## 2018-08-16 PROCEDURE — 97035 APP MDLTY 1+ULTRASOUND EA 15: CPT | Performed by: PHYSICAL THERAPIST

## 2018-08-16 PROCEDURE — 97140 MANUAL THERAPY 1/> REGIONS: CPT | Performed by: PHYSICAL THERAPIST

## 2018-08-16 PROCEDURE — 97110 THERAPEUTIC EXERCISES: CPT | Performed by: PHYSICAL THERAPIST

## 2018-08-16 PROCEDURE — G8987 SELF CARE CURRENT STATUS: HCPCS | Performed by: PHYSICAL THERAPIST

## 2018-08-16 PROCEDURE — G8988 SELF CARE GOAL STATUS: HCPCS | Performed by: PHYSICAL THERAPIST

## 2018-08-16 PROCEDURE — 97014 ELECTRIC STIMULATION THERAPY: CPT | Performed by: PHYSICAL THERAPIST

## 2018-08-16 PROCEDURE — 97760 ORTHOTIC MGMT&TRAING 1ST ENC: CPT | Performed by: PHYSICAL THERAPIST

## 2018-08-16 NOTE — PROGRESS NOTES
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  Dominant- Right; Involved-Right  Pt is progressing steadily with AROM and light strengthening  She has cont working light duty and notes her strength deficit and notation of stiffness interfere with her ability tp provide full patient care    Goals  ST  Decrease pain to 3-4 in 4 weeks            2  Decrease swelling            3  Increase AROM to composite fist            4   Provide orthotic for protection  LT  Increase functional motion and strength for independence with ADL and self care by DC            2   Ability to RTW and recreational activity by DC  Goals met    Plan  Frequency: 2x week  Duration in weeks: 4  Treatment plan discussed with: patient        Subjective Evaluation    History of Present Illness  Date of surgery: 2018  Mechanism of injury: Pt trapped her hand in a door at home 18 fracturing her 4th MC  Pain  Current pain ratin  At best pain ratin  At worst pain rating: 3  Location: right hand    Hand dominance: right    Treatments  Current treatment: physical therapy  Patient Goals  Patient goals for therapy: decreased edema, decreased pain, increased motion, increased strength and return to work          Objective     General Comments     Wrist/Hand Comments  AROM right wrist E/F- 50/50; LF MP- 0/90 PIP- 0/95; DIP- 0/65;                                                 RF MP- 0/85; PIP- 0/105;  DIP- 0/65                                                  SF MP- 0/85 PIP- 0/95; DIP- 0/75  Strength-  II- 46/62  Sensation- occasional tingling RF and SF; intact to LT  Inspection- well healing incision; mild local swelling  Circumference at wrist- 17 0 cm; MPs- 20 0 cm         Flowsheet Rows      Most Recent Value   PT/OT G-Codes   Current Score  psfs-25   Projected Score  psfs-20   FOTO information reviewed  N/A   Assessment Type  Re-evaluation   G code set  Self-Care   Self Care Current Status ()  CJ   Self Care Goal Status ()  CJ          Precautions: wear orthosis when active; no aggressive activity    Daily Treatment Diary     Manual  5/29  6/1  6/6  6/15  7/3  7/12  7/17 7/26  8/16     STM 15  15  15  12  12 12 12  12  12                             liners       X                                          Isotoner          R small       R small           Exercise Diary  5/29  6/1  6/6  6/15  7/3  7/12  7/17  7/26  8/16     TGE 3x  3x  3x  3x  3x  5x  5x  5x  5x     Wrist AROM 10x  10x  10x  10x  10x  10x  10x 10x  10x                              TP         T 2'  T 2'  Y 2'  Y 2'  Y2'  Y 2'        Scrape         XS 2'  T 2/10  T  2/10  T 2/10  T 2/10  T 2/10      Twister        20/20 20/20 20/20   20/20  20/20  20/20      Blue        II 2/10  III 2/10  III  2/10  III 2/10  III 2/10  III 2/10      Jim          20 2/10  20  210  20 2/10  20 2/10  20 2/10      DB E/F          3 2/10  3  2/10  3 2/10  3 2/10  3 2/10      Flexbar          Y 2/10  Y  2/10  R 2/10  R 2/10  R 2/10                                                                                                                                                                                                                                                           Modalities  5/29  6/1  6/6  6/15  7/3  7/12  7/17  7/26  8/16     HP/IFC 15  15  15  15  15  15  15  15  15      US 3MZ   12  12  12  12  12  12  12  12     CP    15  15  15  15  15 15 15 15

## 2018-08-16 NOTE — LETTER
2018    Leann Callahan MD  [address] [address]    Patient: Lonny Thomas   YOB: 1970   Date of Visit: 2018     Encounter Diagnosis     ICD-10-CM    1  Closed nondisplaced fracture of shaft of fourth metacarpal bone of right hand with routine healing, subsequent encounter S62 354D        Dear Dr Delaney Huerta:    Please review the attached Plan of Care from Ted Flower's recent visit  Please verify that you agree therapy should continue by signing the attached document and sending it back to our office  If you have any questions or concerns, please don't hesitate to call  Sincerely,    Rolando Marvin, NAT, CHT    Referring Provider:      I certify that I have read the below Plan of Care and certify the need for these services furnished under this plan of treatment while under my care  Leann Callahan MD  [facility]: [phone]          PT Re-Evaluation     Today's date: 2018  Patient name: Lonny Thomas  : 1970  MRN: [id number]  Referring provider: Demetra Garcia MD  Dx:   Encounter Diagnosis     ICD-10-CM    1  Closed nondisplaced fracture of shaft of fourth metacarpal bone of right hand with routine healing, subsequent encounter S62 354D                   Assessment    Assessment details: Pt is a 42 YO female presenting to PT with pain, decreased AROM, strength and tolerance to activity  Pt would benefit from skilled intervention to adddress these issues and maximize overall function  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  Dominant- Right; Involved-Right  Pt is progressing steadily with AROM and light strengthening  She has cont working light duty and notes her strength deficit and notation of stiffness interfere with her ability tp provide full patient care    Goals  ST  Decrease pain to 3-4 in 4 weeks            2    Decrease swelling 3   Increase AROM to composite fist            4   Provide orthotic for protection  LT  Increase functional motion and strength for independence with ADL and self care by DC            2  Ability to RTW and recreational activity by DC    Plan  Frequency: 2x week  Duration in weeks: 4  Treatment plan discussed with: patient        Subjective Evaluation    History of Present Illness  Date of surgery: 2018  Mechanism of injury: Pt trapped her hand in a door at home 18 fracturing her 4th MC  Pain  Current pain ratin  At best pain ratin  At worst pain rating: 3  Location: right hand    Hand dominance: right    Treatments  Current treatment: physical therapy  Patient Goals  Patient goals for therapy: decreased edema, decreased pain, increased motion, increased strength and return to work          Objective     General Comments     Wrist/Hand Comments  AROM right wrist E/F- 50/50; LF MP- 0/90 PIP- 0/95; DIP- 0/65;                                                 RF MP- 0/85; PIP- 0/105;  DIP- 0/65                                                  SF MP- 0/85 PIP- 0/95; DIP- 0/75  Strength-  II- 46/62  Sensation- occasional tingling RF and SF; intact to LT  Inspection- well healing incision; mild local swelling  Circumference at wrist- 17 0 cm; MPs- 20 0 cm         Flowsheet Rows      Most Recent Value   PT/OT G-Codes   Current Score  psfs-25   Projected Score  psfs-20   FOTO information reviewed  N/A   Assessment Type  Re-evaluation   G code set  Self-Care   Self Care Current Status ()  CJ   Self Care Goal Status ()            Precautions: wear orthosis when active; no aggressive activity    Daily Treatment Diary     Manual  5/29  6/1  6/6  6/15  7/3  7/12  7/17 7/26  8/16     [facility] 15  15  15  12  12  12  12  12  12                             liners       X                                          Isotoner          R small       R small           Exercise Diary    6/15  7/3  7/12  7/17 7/26  8/16     TGE 3x  3x  3x  3x  3x  5x  5x  5x  5x     Wrist AROM 10x  10x  10x  10x  10x  10x  10x 10x  10x                              TP         T 2'  T 2'  Y 2'  Y 2'  Y2'  Y 2'        Scrape         XS 2'  T 2/10  T  2/10  T 2/10  T 2/10  T 2/10      Twister        20/20 20/20 20/20   20/20  20/20  20/20      Blue        II 2/10  III 2/10  III  2/10  III 2/10  III 2/10  III 2/10      Jim          20 2/10  20  210  20 2/10  20 2/10  20 2/10      DB E/F          3 2/10  3  2/10  3 2/10  3 2/10  3 2/10      Flexbar          Y 2/10  Y  2/10  R 2/10  R 2/10  R 2/10                                                                                                                                                                                                                                                           Modalities  5/29  6/1  6/6  6/15  7/3  7/12  7/17 7/26  8/16     HP/IFC 15  15  15  15  15  15  15  15  15      US 3MZ   12  12  12  12  12  12  12  12     CP    15  15  15  15  15 15 15 15

## 2018-08-17 ENCOUNTER — TRANSCRIBE ORDERS (OUTPATIENT)
Dept: PHYSICAL THERAPY | Facility: CLINIC | Age: 48
End: 2018-08-17

## 2018-08-17 DIAGNOSIS — S62.354D CLOSED NONDISPLACED FRACTURE OF SHAFT OF FOURTH METACARPAL BONE OF RIGHT HAND WITH ROUTINE HEALING, SUBSEQUENT ENCOUNTER: Primary | ICD-10-CM

## 2018-08-24 ENCOUNTER — APPOINTMENT (OUTPATIENT)
Dept: PHYSICAL THERAPY | Facility: CLINIC | Age: 48
End: 2018-08-24
Payer: COMMERCIAL

## 2018-08-28 ENCOUNTER — APPOINTMENT (OUTPATIENT)
Dept: PHYSICAL THERAPY | Facility: CLINIC | Age: 48
End: 2018-08-28
Payer: COMMERCIAL